# Patient Record
Sex: FEMALE | Race: WHITE | NOT HISPANIC OR LATINO
[De-identification: names, ages, dates, MRNs, and addresses within clinical notes are randomized per-mention and may not be internally consistent; named-entity substitution may affect disease eponyms.]

---

## 2020-08-20 ENCOUNTER — APPOINTMENT (OUTPATIENT)
Dept: FAMILY MEDICINE | Facility: CLINIC | Age: 20
End: 2020-08-20
Payer: COMMERCIAL

## 2020-08-20 VITALS
HEIGHT: 65 IN | SYSTOLIC BLOOD PRESSURE: 90 MMHG | WEIGHT: 146 LBS | TEMPERATURE: 97.1 F | DIASTOLIC BLOOD PRESSURE: 70 MMHG | BODY MASS INDEX: 24.32 KG/M2 | OXYGEN SATURATION: 99 % | HEART RATE: 76 BPM | RESPIRATION RATE: 14 BRPM

## 2020-08-20 DIAGNOSIS — Z72.89 OTHER PROBLEMS RELATED TO LIFESTYLE: ICD-10-CM

## 2020-08-20 DIAGNOSIS — Z30.9 ENCOUNTER FOR CONTRACEPTIVE MANAGEMENT, UNSPECIFIED: ICD-10-CM

## 2020-08-20 DIAGNOSIS — Z00.00 ENCOUNTER FOR GENERAL ADULT MEDICAL EXAMINATION W/OUT ABNORMAL FINDINGS: ICD-10-CM

## 2020-08-20 DIAGNOSIS — F41.9 ANXIETY DISORDER, UNSPECIFIED: ICD-10-CM

## 2020-08-20 DIAGNOSIS — Z20.828 CONTACT WITH AND (SUSPECTED) EXPOSURE TO OTHER VIRAL COMMUNICABLE DISEASES: ICD-10-CM

## 2020-08-20 PROCEDURE — 99204 OFFICE O/P NEW MOD 45 MIN: CPT | Mod: 25

## 2020-08-20 PROCEDURE — G0442 ANNUAL ALCOHOL SCREEN 15 MIN: CPT | Mod: 59

## 2020-08-20 RX ORDER — MEDROXYPROGESTERONE ACETATE 150 MG/ML
150 INJECTION, SUSPENSION INTRAMUSCULAR
Qty: 1 | Refills: 0 | Status: ACTIVE | COMMUNITY
Start: 2020-08-20

## 2020-08-20 NOTE — HISTORY OF PRESENT ILLNESS
[FreeTextEntry1] : Presents for CPE. Going back to [employer] second year soon depending on pandemic restrictions in next few weeks. Feels well, sees therapist weekly, +adopted, came to [place] from [place] age 1 yo.   \par  \par Last PAP was in June within last year\par 2-3 ice coffees daily; 20 oz ; d/w patient to wean down\par supplements: RUDI daily\par \par meds: depot provera every 3 months (started in July)

## 2020-08-20 NOTE — HEALTH RISK ASSESSMENT
[Good] : ~his/her~  mood as  good [Yes] : Yes [3 or 4 (1 pt)] : 3 or 4  (1 point) [Monthly or less (1 pt)] : Monthly or less (1 point) [Never (0 pts)] : Never (0 points) [No] : In the past 12 months have you used drugs other than those required for medical reasons? No [] : No [Audit-CScore] : 2

## 2020-08-20 NOTE — PHYSICAL EXAM
[No Acute Distress] : no acute distress [Well Nourished] : well nourished [Well Developed] : well developed [Well-Appearing] : well-appearing [Normal Voice/Communication] : normal voice/communication [Normal Sclera/Conjunctiva] : normal sclera/conjunctiva [PERRL] : pupils equal round and reactive to light [EOMI] : extraocular movements intact [Normal Outer Ear/Nose] : the outer ears and nose were normal in appearance [Normal Oropharynx] : the oropharynx was normal [Normal TMs] : both tympanic membranes were normal [No Lymphadenopathy] : no lymphadenopathy [No JVD] : no jugular venous distention [Supple] : supple [Thyroid Normal, No Nodules] : the thyroid was normal and there were no nodules present [No Accessory Muscle Use] : no accessory muscle use [No Respiratory Distress] : no respiratory distress  [Clear to Auscultation] : lungs were clear to auscultation bilaterally [Normal Rate] : normal rate  [Regular Rhythm] : with a regular rhythm [Normal S1, S2] : normal S1 and S2 [Pedal Pulses Present] : the pedal pulses are present [No Edema] : there was no peripheral edema [No Extremity Clubbing/Cyanosis] : no extremity clubbing/cyanosis [Soft] : abdomen soft [Non Tender] : non-tender [Non-distended] : non-distended [No Masses] : no abdominal mass palpated [No HSM] : no HSM [Normal Supraclavicular Nodes] : no supraclavicular lymphadenopathy [Normal Anterior Cervical Nodes] : no anterior cervical lymphadenopathy [No Joint Swelling] : no joint swelling [Grossly Normal Strength/Tone] : grossly normal strength/tone [Coordination Grossly Intact] : coordination grossly intact [No Rash] : no rash [No Focal Deficits] : no focal deficits [Normal Gait] : normal gait [Speech Grossly Normal] : speech grossly normal [Memory Grossly Normal] : memory grossly normal [Normal Affect] : the affect was normal [Alert and Oriented x3] : oriented to person, place, and time [Normal Mood] : the mood was normal [Normal Insight/Judgement] : insight and judgment were intact

## 2020-08-20 NOTE — REVIEW OF SYSTEMS
[Patient Intake Form Reviewed] : Patient intake form was reviewed [FreeTextEntry2] : noted on intake form: has gained weight recently, seasonal allergies and apple allergy, mood swings since age 11 yo, dizziness - orthostatic

## 2020-08-20 NOTE — ASSESSMENT
[FreeTextEntry1] : continue regular counseling advised\par blood work ordered\par RTO annually and prn for CPE

## 2021-01-21 ENCOUNTER — OFFICE VISIT (OUTPATIENT)
Dept: OBGYN CLINIC | Facility: CLINIC | Age: 21
End: 2021-01-21
Payer: COMMERCIAL

## 2021-01-21 VITALS
DIASTOLIC BLOOD PRESSURE: 72 MMHG | BODY MASS INDEX: 22.57 KG/M2 | SYSTOLIC BLOOD PRESSURE: 100 MMHG | WEIGHT: 132.2 LBS | HEIGHT: 64 IN

## 2021-01-21 DIAGNOSIS — Z30.011 INITIATION OF OCP (BCP): Primary | ICD-10-CM

## 2021-01-21 PROCEDURE — 99202 OFFICE O/P NEW SF 15 MIN: CPT | Performed by: NURSE PRACTITIONER

## 2021-01-21 RX ORDER — DROSPIRENONE AND ETHINYL ESTRADIOL 0.02-3(28)
1 KIT ORAL DAILY
Qty: 28 TABLET | Refills: 1 | Status: SHIPPED | OUTPATIENT
Start: 2021-01-21 | End: 2021-02-18

## 2021-01-21 NOTE — PROGRESS NOTES
Kevin Ludwig is a 21 y o  female who presents for contraception counseling  She was on Depo-provera for a total of 3 doses  She was in December and decided not to continue with the Depo-provera  She is interested in starting [place]  She had been on this OCP prior to going on Depo  The patient has no complaints today  The patient is sexually active  Pertinent past medical history: none  Menstrual History:  OB History        1    Para        Term                AB   1    Living           SAB   1    TAB        Ectopic        Multiple        Live Births                      Patient's last menstrual period was 2021  The following portions of the patient's history were reviewed and updated as appropriate: allergies, current medications, past family history, past medical history, past social history, past surgical history and problem list     Review of Systems  Pertinent items are noted in HPI  Objective      /72   Ht 5' 4" (1 626 m)   Wt 60 kg (132 lb 3 2 oz)   LMP 2021   BMI 22 69 kg/m²     Physical Exam  Constitutional:       Appearance: She is well-developed  HENT:      Head: Normocephalic and atraumatic  Neck:      Musculoskeletal: Neck supple  Cardiovascular:      Rate and Rhythm: Normal rate and regular rhythm  Pulmonary:      Effort: Pulmonary effort is normal       Breath sounds: Normal breath sounds  Neurological:      Mental Status: She is alert and oriented to person, place, and time  Skin:     General: Skin is warm  Vitals signs and nursing note reviewed  Assessment and Plan      21 y o , starting OCP (estrogen/progesterone), no contraindications  I have reviewed the risk and benefits of Oral OCP's, including the risk of blood clots, elevated BP, weight gain and Headaches  Benefits include reducing painful menses and heavy bleeding  Administration education reviewed   She was advised to use condoms as backup for the first month  Follow up in 2 months

## 2021-02-06 ENCOUNTER — NURSE TRIAGE (OUTPATIENT)
Dept: OTHER | Facility: OTHER | Age: 21
End: 2021-02-06

## 2021-02-06 NOTE — TELEPHONE ENCOUNTER
Reason for Disposition   Irregular or unexpected bleeding or spotting    Answer Assessment - Initial Assessment Questions  1  TYPE: "What is the name of the birth control pill you are using?"      Tracy     2  PACK TYPE: "How do you take your birth control pill?"    - 28-Day Cycle/Pack: Takes an active hormone pill days 1-21 and placebo pill on days 21-28    - 28-Day Cycle/21-Day Pack: Takes an active hormone pill days 1-21, followed by a pill-free week    - 3-Month Cycle/Pack: Takes an active pill for 3 months, followed by pill free week or placebo week    - Continuous: Takes an active hormone pill every day, continuously      28 day cycle/ pack with placebo pills on days 21-28    3  INITIATION: "When did you first start taking this birth control pill?"       2 weeks ago     4  SYMPTOM: "What is the main symptom (or question) you're concerned about?"      Vaginal bleeding and cramping    5  ONSET: "When did the s/s start?"       3 days ago     6  VAGINAL BLEEDING: "Are you having any unusual vaginal bleeding?"    - NONE    - SPOTTING: spotting or pinkish / brownish mucous discharge; does not fill panty-liner or pad    - MILD: less than 1 pad / hour; less than patient's usual menstrual bleeding    - MODERATE: 1-2 pads / hour; small-medium blood clots (e g , pea, grape, small coin)    - SEVERE: soaking 2 or more pads/hour for 2 or more hours; bleeding not contained by pads or   tampons      Soaking a pad every 3 hours, and a few clots noted the size of peas    7  PAIN: "Is there any pain?" (Scale: 1-10; mild, moderate, severe)  Cramping, 7/10    8  MISSED/LATE PILLS: "Did you miss or take any pills late?" If yes, "When? How many pills?"      Denies     9   PREGNANCY: "Are you concerned you might be pregnant?" "When was your last menstrual   period?"      Denies    Protocols used: CONTRACEPTION - BIRTH CONTROL PILLS - COMBINED-ADULT-

## 2021-02-06 NOTE — TELEPHONE ENCOUNTER
Regarding: heaving period bleeding and cramping  ----- Message from [facility] sent at 2/6/2021  2:45 PM EST -----  "I got my period three days ago    I started a new birth control and I am now experiencing heavy bleeding and cramping "

## 2022-12-27 ENCOUNTER — NON-APPOINTMENT (OUTPATIENT)
Age: 22
End: 2022-12-27

## 2022-12-27 DIAGNOSIS — Z11.3 ENCOUNTER FOR SCREENING FOR INFECTIONS WITH A PREDOMINANTLY SEXUAL MODE OF TRANSMISSION: ICD-10-CM
